# Patient Record
Sex: FEMALE | Race: WHITE | NOT HISPANIC OR LATINO | Employment: FULL TIME | URBAN - METROPOLITAN AREA
[De-identification: names, ages, dates, MRNs, and addresses within clinical notes are randomized per-mention and may not be internally consistent; named-entity substitution may affect disease eponyms.]

---

## 2018-06-19 PROBLEM — M26.609 TMJ (TEMPOROMANDIBULAR JOINT DISORDER): Status: ACTIVE | Noted: 2018-06-19

## 2018-06-19 PROBLEM — Z86.59 HISTORY OF ADHD: Status: ACTIVE | Noted: 2018-06-19

## 2018-08-13 PROBLEM — Z79.899 CONTROLLED SUBSTANCE AGREEMENT SIGNED: Status: ACTIVE | Noted: 2018-08-13

## 2018-08-13 PROBLEM — F90.0 ATTENTION DEFICIT HYPERACTIVITY DISORDER (ADHD), PREDOMINANTLY INATTENTIVE TYPE: Status: ACTIVE | Noted: 2018-08-13

## 2018-08-13 PROBLEM — F41.9 ANXIETY: Status: ACTIVE | Noted: 2018-08-13

## 2019-04-29 PROBLEM — Z02.83 ENCOUNTER FOR DRUG SCREENING: Status: ACTIVE | Noted: 2019-04-29

## 2019-06-01 PROBLEM — M25.562 PAIN IN LATERAL PORTION OF LEFT KNEE: Status: ACTIVE | Noted: 2019-06-01

## 2019-06-17 PROBLEM — M25.562 ACUTE PAIN OF LEFT KNEE: Status: ACTIVE | Noted: 2019-06-17

## 2019-06-26 ENCOUNTER — HOSPITAL ENCOUNTER (OUTPATIENT)
Dept: RADIOLOGY | Facility: MEDICAL CENTER | Age: 34
End: 2019-06-26
Attending: ORTHOPAEDIC SURGERY

## 2019-07-01 PROBLEM — J34.89 PERFORATED NASAL SEPTUM: Status: ACTIVE | Noted: 2019-07-01

## 2020-09-03 PROBLEM — M25.562 PAIN IN LATERAL PORTION OF LEFT KNEE: Status: RESOLVED | Noted: 2019-06-01 | Resolved: 2020-09-03

## 2020-09-03 PROBLEM — Z02.83 ENCOUNTER FOR DRUG SCREENING: Status: RESOLVED | Noted: 2019-04-29 | Resolved: 2020-09-03

## 2020-09-03 PROBLEM — M25.562 ACUTE PAIN OF LEFT KNEE: Status: RESOLVED | Noted: 2019-06-17 | Resolved: 2020-09-03

## 2020-09-03 PROBLEM — Z86.59 HISTORY OF ADHD: Status: RESOLVED | Noted: 2018-06-19 | Resolved: 2020-09-03

## 2024-12-18 ENCOUNTER — ANCILLARY PROCEDURE (OUTPATIENT)
Dept: MATERNAL FETAL MEDICINE | Facility: MEDICAL CENTER | Age: 39
End: 2024-12-18
Payer: COMMERCIAL

## 2024-12-18 VITALS
DIASTOLIC BLOOD PRESSURE: 62 MMHG | SYSTOLIC BLOOD PRESSURE: 127 MMHG | HEART RATE: 77 BPM | WEIGHT: 146.5 LBS | BODY MASS INDEX: 25.15 KG/M2

## 2024-12-18 DIAGNOSIS — O30.042 DICHORIONIC DIAMNIOTIC TWIN PREGNANCY IN SECOND TRIMESTER: ICD-10-CM

## 2024-12-18 DIAGNOSIS — Z3A.20 20 WEEKS GESTATION OF PREGNANCY: ICD-10-CM

## 2024-12-18 DIAGNOSIS — O09.512 PRIMIGRAVIDA OF ADVANCED MATERNAL AGE IN SECOND TRIMESTER: ICD-10-CM

## 2024-12-18 DIAGNOSIS — O35.BXX2 FETAL CARDIAC ANOMALY COMPLICATING PREGNANCY, ANTEPARTUM, FETUS 2: ICD-10-CM

## 2024-12-18 DIAGNOSIS — O28.3 ECHOGENIC BOWEL OF FETUS ON PRENATAL ULTRASOUND: ICD-10-CM

## 2024-12-18 DIAGNOSIS — O35.BXX1 FETAL CARDIAC ANOMALY COMPLICATING PREGNANCY, ANTEPARTUM, FETUS 1: ICD-10-CM

## 2024-12-18 DIAGNOSIS — O28.3 ECHOGENIC INTRACARDIAC FOCUS OF FETUS ON PRENATAL ULTRASOUND: ICD-10-CM

## 2024-12-18 DIAGNOSIS — O30.049 TWIN DICHORIONIC DIAMNIOTIC PLACENTA: ICD-10-CM

## 2024-12-18 PROCEDURE — 76817 TRANSVAGINAL US OBSTETRIC: CPT | Performed by: OBSTETRICS & GYNECOLOGY

## 2024-12-18 PROCEDURE — 99202 OFFICE O/P NEW SF 15 MIN: CPT | Performed by: OBSTETRICS & GYNECOLOGY

## 2024-12-18 PROCEDURE — 76812 OB US DETAILED ADDL FETUS: CPT | Performed by: OBSTETRICS & GYNECOLOGY

## 2024-12-18 PROCEDURE — 76811 OB US DETAILED SNGL FETUS: CPT | Performed by: OBSTETRICS & GYNECOLOGY

## 2025-01-15 ENCOUNTER — ANCILLARY PROCEDURE (OUTPATIENT)
Dept: MATERNAL FETAL MEDICINE | Facility: MEDICAL CENTER | Age: 40
End: 2025-01-15
Attending: OBSTETRICS & GYNECOLOGY
Payer: COMMERCIAL

## 2025-01-15 VITALS
WEIGHT: 157.7 LBS | BODY MASS INDEX: 27.07 KG/M2 | DIASTOLIC BLOOD PRESSURE: 66 MMHG | SYSTOLIC BLOOD PRESSURE: 125 MMHG | HEART RATE: 85 BPM

## 2025-01-15 DIAGNOSIS — O30.049 TWIN DICHORIONIC DIAMNIOTIC PLACENTA: ICD-10-CM

## 2025-01-15 PROCEDURE — 76816 OB US FOLLOW-UP PER FETUS: CPT | Performed by: OBSTETRICS & GYNECOLOGY

## 2025-03-05 ENCOUNTER — NON-PROVIDER VISIT (OUTPATIENT)
Dept: MATERNAL FETAL MEDICINE | Facility: MEDICAL CENTER | Age: 40
End: 2025-03-05
Payer: COMMERCIAL

## 2025-03-05 VITALS
HEART RATE: 78 BPM | DIASTOLIC BLOOD PRESSURE: 70 MMHG | SYSTOLIC BLOOD PRESSURE: 126 MMHG | WEIGHT: 177.4 LBS | BODY MASS INDEX: 30.45 KG/M2

## 2025-03-05 DIAGNOSIS — O35.BXX2 FETAL CARDIAC ANOMALY COMPLICATING PREGNANCY, ANTEPARTUM, FETUS 2: ICD-10-CM

## 2025-03-05 DIAGNOSIS — O30.049 TWIN DICHORIONIC DIAMNIOTIC PLACENTA: ICD-10-CM

## 2025-03-05 DIAGNOSIS — Z3A.31 31 WEEKS GESTATION OF PREGNANCY: ICD-10-CM

## 2025-03-05 DIAGNOSIS — O36.5931 POOR FETAL GROWTH AFFECTING MANAGEMENT OF MOTHER IN THIRD TRIMESTER, FETUS 1 OF MULTIPLE GESTATION: ICD-10-CM

## 2025-03-05 DIAGNOSIS — O35.BXX1 FETAL CARDIAC ANOMALY COMPLICATING PREGNANCY, ANTEPARTUM, FETUS 1: ICD-10-CM

## 2025-03-05 DIAGNOSIS — O36.5932 POOR FETAL GROWTH AFFECTING MANAGEMENT OF MOTHER IN THIRD TRIMESTER, FETUS 2 OF MULTIPLE GESTATION: ICD-10-CM

## 2025-03-05 DIAGNOSIS — O30.043 DICHORIONIC DIAMNIOTIC TWIN PREGNANCY IN THIRD TRIMESTER: ICD-10-CM

## 2025-03-06 ENCOUNTER — TELEPHONE (OUTPATIENT)
Dept: MATERNAL FETAL MEDICINE | Facility: MEDICAL CENTER | Age: 40
End: 2025-03-06
Payer: COMMERCIAL

## 2025-03-06 NOTE — TELEPHONE ENCOUNTER
Pt declined MVP Doppler /NST appt per Dr green  since PT doesn't want to do roccemnded appts we need to cancel growth appt. Pt will speak to her OB on recommended care.

## 2025-03-06 NOTE — TELEPHONE ENCOUNTER
Pt was asked to make weekly appt for MVP/Doppler/NST and she declined stating that she can have them done at her OBs office instead.

## 2025-04-03 ENCOUNTER — APPOINTMENT (OUTPATIENT)
Dept: MATERNAL FETAL MEDICINE | Facility: MEDICAL CENTER | Age: 40
End: 2025-04-03
Payer: COMMERCIAL